# Patient Record
Sex: FEMALE | Race: WHITE | NOT HISPANIC OR LATINO | Employment: FULL TIME | URBAN - METROPOLITAN AREA
[De-identification: names, ages, dates, MRNs, and addresses within clinical notes are randomized per-mention and may not be internally consistent; named-entity substitution may affect disease eponyms.]

---

## 2023-05-12 ENCOUNTER — APPOINTMENT (OUTPATIENT)
Dept: ADMISSIONS | Facility: MEDICAL CENTER | Age: 48
End: 2023-05-12
Attending: ORTHOPAEDIC SURGERY
Payer: COMMERCIAL

## 2023-05-16 ENCOUNTER — PRE-ADMISSION TESTING (OUTPATIENT)
Dept: ADMISSIONS | Facility: MEDICAL CENTER | Age: 48
End: 2023-05-16
Attending: ORTHOPAEDIC SURGERY
Payer: COMMERCIAL

## 2023-05-17 ENCOUNTER — ANESTHESIA EVENT (OUTPATIENT)
Dept: SURGERY | Facility: MEDICAL CENTER | Age: 48
End: 2023-05-17
Payer: COMMERCIAL

## 2023-05-17 ENCOUNTER — APPOINTMENT (OUTPATIENT)
Dept: RADIOLOGY | Facility: MEDICAL CENTER | Age: 48
End: 2023-05-17
Attending: ORTHOPAEDIC SURGERY
Payer: COMMERCIAL

## 2023-05-17 ENCOUNTER — ANESTHESIA (OUTPATIENT)
Dept: SURGERY | Facility: MEDICAL CENTER | Age: 48
End: 2023-05-17
Payer: COMMERCIAL

## 2023-05-17 ENCOUNTER — HOSPITAL ENCOUNTER (OUTPATIENT)
Facility: MEDICAL CENTER | Age: 48
End: 2023-05-17
Attending: ORTHOPAEDIC SURGERY | Admitting: ORTHOPAEDIC SURGERY
Payer: COMMERCIAL

## 2023-05-17 VITALS
WEIGHT: 172.84 LBS | TEMPERATURE: 97.6 F | HEART RATE: 90 BPM | RESPIRATION RATE: 18 BRPM | BODY MASS INDEX: 32.63 KG/M2 | SYSTOLIC BLOOD PRESSURE: 124 MMHG | OXYGEN SATURATION: 93 % | HEIGHT: 61 IN | DIASTOLIC BLOOD PRESSURE: 58 MMHG

## 2023-05-17 PROCEDURE — 160048 HCHG OR STATISTICAL LEVEL 1-5: Performed by: ORTHOPAEDIC SURGERY

## 2023-05-17 PROCEDURE — 160046 HCHG PACU - 1ST 60 MINS PHASE II: Performed by: ORTHOPAEDIC SURGERY

## 2023-05-17 PROCEDURE — 700101 HCHG RX REV CODE 250: Performed by: ORTHOPAEDIC SURGERY

## 2023-05-17 PROCEDURE — 64445 NJX AA&/STRD SCIATIC NRV IMG: CPT | Mod: 59,LT | Performed by: STUDENT IN AN ORGANIZED HEALTH CARE EDUCATION/TRAINING PROGRAM

## 2023-05-17 PROCEDURE — 700105 HCHG RX REV CODE 258: Performed by: ORTHOPAEDIC SURGERY

## 2023-05-17 PROCEDURE — 160009 HCHG ANES TIME/MIN: Performed by: ORTHOPAEDIC SURGERY

## 2023-05-17 PROCEDURE — 110371 HCHG SHELL REV 272: Performed by: ORTHOPAEDIC SURGERY

## 2023-05-17 PROCEDURE — 160035 HCHG PACU - 1ST 60 MINS PHASE I: Performed by: ORTHOPAEDIC SURGERY

## 2023-05-17 PROCEDURE — 700101 HCHG RX REV CODE 250: Performed by: STUDENT IN AN ORGANIZED HEALTH CARE EDUCATION/TRAINING PROGRAM

## 2023-05-17 PROCEDURE — 160002 HCHG RECOVERY MINUTES (STAT): Performed by: ORTHOPAEDIC SURGERY

## 2023-05-17 PROCEDURE — 160028 HCHG SURGERY MINUTES - 1ST 30 MINS LEVEL 3: Performed by: ORTHOPAEDIC SURGERY

## 2023-05-17 PROCEDURE — 28615 REPAIR FOOT DISLOCATION: CPT | Mod: ASROC,59 | Performed by: SPECIALIST/TECHNOLOGIST, OTHER

## 2023-05-17 PROCEDURE — 28615 REPAIR FOOT DISLOCATION: CPT | Mod: 59 | Performed by: ORTHOPAEDIC SURGERY

## 2023-05-17 PROCEDURE — 160039 HCHG SURGERY MINUTES - EA ADDL 1 MIN LEVEL 3: Performed by: ORTHOPAEDIC SURGERY

## 2023-05-17 PROCEDURE — 73630 X-RAY EXAM OF FOOT: CPT | Mod: LT

## 2023-05-17 PROCEDURE — 700102 HCHG RX REV CODE 250 W/ 637 OVERRIDE(OP): Performed by: STUDENT IN AN ORGANIZED HEALTH CARE EDUCATION/TRAINING PROGRAM

## 2023-05-17 PROCEDURE — 160025 RECOVERY II MINUTES (STATS): Performed by: ORTHOPAEDIC SURGERY

## 2023-05-17 PROCEDURE — 700111 HCHG RX REV CODE 636 W/ 250 OVERRIDE (IP): Performed by: STUDENT IN AN ORGANIZED HEALTH CARE EDUCATION/TRAINING PROGRAM

## 2023-05-17 PROCEDURE — 01480 ANES OPEN PX LOWER L/A/F NOS: CPT | Performed by: STUDENT IN AN ORGANIZED HEALTH CARE EDUCATION/TRAINING PROGRAM

## 2023-05-17 PROCEDURE — 64445 NJX AA&/STRD SCIATIC NRV IMG: CPT | Performed by: ORTHOPAEDIC SURGERY

## 2023-05-17 PROCEDURE — C1713 ANCHOR/SCREW BN/BN,TIS/BN: HCPCS | Performed by: ORTHOPAEDIC SURGERY

## 2023-05-17 PROCEDURE — A9270 NON-COVERED ITEM OR SERVICE: HCPCS | Performed by: STUDENT IN AN ORGANIZED HEALTH CARE EDUCATION/TRAINING PROGRAM

## 2023-05-17 PROCEDURE — 700111 HCHG RX REV CODE 636 W/ 250 OVERRIDE (IP): Performed by: ORTHOPAEDIC SURGERY

## 2023-05-17 DEVICE — SCREW BONE VARIAX T10 FULL THREAD L24 MM OD3.5 MM FOOT ANKLE STARDRIVE NONSTERILE: Type: IMPLANTABLE DEVICE | Site: FOOT | Status: FUNCTIONAL

## 2023-05-17 DEVICE — WIRE FIXATION KIRSCHNER L150 MM OD1.6 MM: Type: IMPLANTABLE DEVICE | Site: FOOT | Status: FUNCTIONAL

## 2023-05-17 DEVICE — SCREW BONE T8 FULL THREAD L16 MM OD2.7 MM STARDRIVE NONSTERILE VARIAX FOOT PLATE SYSTEM: Type: IMPLANTABLE DEVICE | Site: FOOT | Status: FUNCTIONAL

## 2023-05-17 DEVICE — IMPLANTABLE DEVICE: Type: IMPLANTABLE DEVICE | Site: FOOT | Status: FUNCTIONAL

## 2023-05-17 DEVICE — SCREW BONE T8 FULL THREAD L28 MM OD2.7 MM STARDRIVE NONSTERILE VARIAX FOOT PLATE SYSTEM: Type: IMPLANTABLE DEVICE | Site: FOOT | Status: FUNCTIONAL

## 2023-05-17 DEVICE — SCREW BONE VARIAX T10 FULL THREAD L30 MM OD3.5 MM FOOT ANKLE STARDRIVE NONSTERILE: Type: IMPLANTABLE DEVICE | Site: FOOT | Status: FUNCTIONAL

## 2023-05-17 RX ORDER — MAGNESIUM HYDROXIDE 1200 MG/15ML
LIQUID ORAL
Status: COMPLETED | OUTPATIENT
Start: 2023-05-17 | End: 2023-05-17

## 2023-05-17 RX ORDER — CEFAZOLIN SODIUM 1 G/3ML
2 INJECTION, POWDER, FOR SOLUTION INTRAMUSCULAR; INTRAVENOUS ONCE
Status: COMPLETED | OUTPATIENT
Start: 2023-05-17 | End: 2023-05-17

## 2023-05-17 RX ORDER — DIPHENHYDRAMINE HYDROCHLORIDE 50 MG/ML
12.5 INJECTION INTRAMUSCULAR; INTRAVENOUS
Status: DISCONTINUED | OUTPATIENT
Start: 2023-05-17 | End: 2023-05-17 | Stop reason: HOSPADM

## 2023-05-17 RX ORDER — DEXAMETHASONE SODIUM PHOSPHATE 4 MG/ML
INJECTION, SOLUTION INTRA-ARTICULAR; INTRALESIONAL; INTRAMUSCULAR; INTRAVENOUS; SOFT TISSUE PRN
Status: DISCONTINUED | OUTPATIENT
Start: 2023-05-17 | End: 2023-05-17 | Stop reason: SURG

## 2023-05-17 RX ORDER — SODIUM CHLORIDE, SODIUM LACTATE, POTASSIUM CHLORIDE, CALCIUM CHLORIDE 600; 310; 30; 20 MG/100ML; MG/100ML; MG/100ML; MG/100ML
INJECTION, SOLUTION INTRAVENOUS CONTINUOUS
Status: DISCONTINUED | OUTPATIENT
Start: 2023-05-17 | End: 2023-05-17 | Stop reason: HOSPADM

## 2023-05-17 RX ORDER — MIDAZOLAM HYDROCHLORIDE 1 MG/ML
INJECTION INTRAMUSCULAR; INTRAVENOUS PRN
Status: DISCONTINUED | OUTPATIENT
Start: 2023-05-17 | End: 2023-05-17 | Stop reason: SURG

## 2023-05-17 RX ORDER — EPHEDRINE SULFATE 50 MG/ML
INJECTION, SOLUTION INTRAVENOUS PRN
Status: DISCONTINUED | OUTPATIENT
Start: 2023-05-17 | End: 2023-05-17 | Stop reason: SURG

## 2023-05-17 RX ORDER — HYDROMORPHONE HYDROCHLORIDE 1 MG/ML
0.1 INJECTION, SOLUTION INTRAMUSCULAR; INTRAVENOUS; SUBCUTANEOUS
Status: DISCONTINUED | OUTPATIENT
Start: 2023-05-17 | End: 2023-05-17 | Stop reason: HOSPADM

## 2023-05-17 RX ORDER — HALOPERIDOL 5 MG/ML
1 INJECTION INTRAMUSCULAR
Status: DISCONTINUED | OUTPATIENT
Start: 2023-05-17 | End: 2023-05-17 | Stop reason: HOSPADM

## 2023-05-17 RX ORDER — MEPERIDINE HYDROCHLORIDE 25 MG/ML
12.5 INJECTION INTRAMUSCULAR; INTRAVENOUS; SUBCUTANEOUS
Status: DISCONTINUED | OUTPATIENT
Start: 2023-05-17 | End: 2023-05-17 | Stop reason: HOSPADM

## 2023-05-17 RX ORDER — ONDANSETRON 2 MG/ML
INJECTION INTRAMUSCULAR; INTRAVENOUS PRN
Status: DISCONTINUED | OUTPATIENT
Start: 2023-05-17 | End: 2023-05-17 | Stop reason: SURG

## 2023-05-17 RX ORDER — CELECOXIB 200 MG/1
200 CAPSULE ORAL ONCE
Status: COMPLETED | OUTPATIENT
Start: 2023-05-17 | End: 2023-05-17

## 2023-05-17 RX ORDER — BUPIVACAINE HYDROCHLORIDE 5 MG/ML
INJECTION, SOLUTION EPIDURAL; INTRACAUDAL
Status: COMPLETED | OUTPATIENT
Start: 2023-05-17 | End: 2023-05-17

## 2023-05-17 RX ORDER — OXYCODONE HCL 5 MG/5 ML
10 SOLUTION, ORAL ORAL
Status: COMPLETED | OUTPATIENT
Start: 2023-05-17 | End: 2023-05-17

## 2023-05-17 RX ORDER — ACETAMINOPHEN 500 MG
1000 TABLET ORAL EVERY 6 HOURS PRN
COMMUNITY

## 2023-05-17 RX ORDER — HYDRALAZINE HYDROCHLORIDE 20 MG/ML
5 INJECTION INTRAMUSCULAR; INTRAVENOUS
Status: DISCONTINUED | OUTPATIENT
Start: 2023-05-17 | End: 2023-05-17 | Stop reason: HOSPADM

## 2023-05-17 RX ORDER — PHENYLEPHRINE HYDROCHLORIDE 10 MG/ML
INJECTION, SOLUTION INTRAMUSCULAR; INTRAVENOUS; SUBCUTANEOUS PRN
Status: DISCONTINUED | OUTPATIENT
Start: 2023-05-17 | End: 2023-05-17 | Stop reason: SURG

## 2023-05-17 RX ORDER — EPHEDRINE SULFATE 50 MG/ML
5 INJECTION, SOLUTION INTRAVENOUS
Status: DISCONTINUED | OUTPATIENT
Start: 2023-05-17 | End: 2023-05-17 | Stop reason: HOSPADM

## 2023-05-17 RX ORDER — ONDANSETRON 2 MG/ML
4 INJECTION INTRAMUSCULAR; INTRAVENOUS
Status: DISCONTINUED | OUTPATIENT
Start: 2023-05-17 | End: 2023-05-17 | Stop reason: HOSPADM

## 2023-05-17 RX ORDER — HYDROMORPHONE HYDROCHLORIDE 1 MG/ML
0.4 INJECTION, SOLUTION INTRAMUSCULAR; INTRAVENOUS; SUBCUTANEOUS
Status: DISCONTINUED | OUTPATIENT
Start: 2023-05-17 | End: 2023-05-17 | Stop reason: HOSPADM

## 2023-05-17 RX ORDER — LABETALOL HYDROCHLORIDE 5 MG/ML
5 INJECTION, SOLUTION INTRAVENOUS
Status: DISCONTINUED | OUTPATIENT
Start: 2023-05-17 | End: 2023-05-17 | Stop reason: HOSPADM

## 2023-05-17 RX ORDER — OXYCODONE HCL 5 MG/5 ML
5 SOLUTION, ORAL ORAL
Status: COMPLETED | OUTPATIENT
Start: 2023-05-17 | End: 2023-05-17

## 2023-05-17 RX ORDER — SCOLOPAMINE TRANSDERMAL SYSTEM 1 MG/1
1 PATCH, EXTENDED RELEASE TRANSDERMAL
Status: DISCONTINUED | OUTPATIENT
Start: 2023-05-17 | End: 2023-05-17 | Stop reason: HOSPADM

## 2023-05-17 RX ORDER — HYDROMORPHONE HYDROCHLORIDE 1 MG/ML
0.2 INJECTION, SOLUTION INTRAMUSCULAR; INTRAVENOUS; SUBCUTANEOUS
Status: DISCONTINUED | OUTPATIENT
Start: 2023-05-17 | End: 2023-05-17 | Stop reason: HOSPADM

## 2023-05-17 RX ORDER — LIDOCAINE HYDROCHLORIDE 20 MG/ML
INJECTION, SOLUTION EPIDURAL; INFILTRATION; INTRACAUDAL; PERINEURAL PRN
Status: DISCONTINUED | OUTPATIENT
Start: 2023-05-17 | End: 2023-05-17 | Stop reason: SURG

## 2023-05-17 RX ADMIN — PHENYLEPHRINE HYDROCHLORIDE 100 MCG: 10 INJECTION INTRAVENOUS at 12:59

## 2023-05-17 RX ADMIN — EPHEDRINE SULFATE 5 MG: 50 INJECTION, SOLUTION INTRAVENOUS at 12:39

## 2023-05-17 RX ADMIN — DEXAMETHASONE SODIUM PHOSPHATE 4 MG: 4 INJECTION INTRA-ARTICULAR; INTRALESIONAL; INTRAMUSCULAR; INTRAVENOUS; SOFT TISSUE at 12:43

## 2023-05-17 RX ADMIN — PROPOFOL 250 MG: 10 INJECTION, EMULSION INTRAVENOUS at 12:39

## 2023-05-17 RX ADMIN — CEFAZOLIN 2 G: 1 INJECTION, POWDER, FOR SOLUTION INTRAMUSCULAR; INTRAVENOUS at 12:40

## 2023-05-17 RX ADMIN — MIDAZOLAM 2 MG: 1 INJECTION, SOLUTION INTRAMUSCULAR; INTRAVENOUS at 12:24

## 2023-05-17 RX ADMIN — OXYCODONE HYDROCHLORIDE 5 MG: 5 SOLUTION ORAL at 13:55

## 2023-05-17 RX ADMIN — LIDOCAINE HYDROCHLORIDE 100 MG: 20 INJECTION, SOLUTION EPIDURAL; INFILTRATION; INTRACAUDAL at 12:39

## 2023-05-17 RX ADMIN — SCOPOLAMINE 1 PATCH: 1.5 PATCH, EXTENDED RELEASE TRANSDERMAL at 11:35

## 2023-05-17 RX ADMIN — FENTANYL CITRATE 50 MCG: 50 INJECTION, SOLUTION INTRAMUSCULAR; INTRAVENOUS at 12:35

## 2023-05-17 RX ADMIN — FENTANYL CITRATE 25 MCG: 50 INJECTION, SOLUTION INTRAMUSCULAR; INTRAVENOUS at 14:00

## 2023-05-17 RX ADMIN — FENTANYL CITRATE 25 MCG: 50 INJECTION, SOLUTION INTRAMUSCULAR; INTRAVENOUS at 14:03

## 2023-05-17 RX ADMIN — PHENYLEPHRINE HYDROCHLORIDE 200 MCG: 10 INJECTION INTRAVENOUS at 12:47

## 2023-05-17 RX ADMIN — FENTANYL CITRATE 25 MCG: 50 INJECTION, SOLUTION INTRAMUSCULAR; INTRAVENOUS at 14:06

## 2023-05-17 RX ADMIN — ONDANSETRON 4 MG: 2 INJECTION INTRAMUSCULAR; INTRAVENOUS at 13:51

## 2023-05-17 RX ADMIN — SODIUM CHLORIDE, POTASSIUM CHLORIDE, SODIUM LACTATE AND CALCIUM CHLORIDE: 600; 310; 30; 20 INJECTION, SOLUTION INTRAVENOUS at 11:30

## 2023-05-17 RX ADMIN — PHENYLEPHRINE HYDROCHLORIDE 100 MCG: 10 INJECTION INTRAVENOUS at 13:08

## 2023-05-17 RX ADMIN — PROPOFOL 100 MG: 10 INJECTION, EMULSION INTRAVENOUS at 12:42

## 2023-05-17 RX ADMIN — FENTANYL CITRATE 25 MCG: 50 INJECTION, SOLUTION INTRAMUSCULAR; INTRAVENOUS at 13:56

## 2023-05-17 RX ADMIN — FENTANYL CITRATE 50 MCG: 50 INJECTION, SOLUTION INTRAMUSCULAR; INTRAVENOUS at 12:43

## 2023-05-17 RX ADMIN — CELECOXIB 200 MG: 200 CAPSULE ORAL at 11:30

## 2023-05-17 RX ADMIN — BUPIVACAINE HYDROCHLORIDE 20 ML: 5 INJECTION, SOLUTION EPIDURAL; INTRACAUDAL at 12:25

## 2023-05-17 RX ADMIN — HYDROMORPHONE HYDROCHLORIDE 0.2 MG: 1 INJECTION, SOLUTION INTRAMUSCULAR; INTRAVENOUS; SUBCUTANEOUS at 14:23

## 2023-05-17 ASSESSMENT — FIBROSIS 4 INDEX: FIB4 SCORE: 0.63

## 2023-05-17 ASSESSMENT — PAIN DESCRIPTION - PAIN TYPE: TYPE: SURGICAL PAIN

## 2023-05-17 NOTE — ANESTHESIA TIME REPORT
Anesthesia Start and Stop Event Times     Date Time Event    5/17/2023 1234 Ready for Procedure     1235 Anesthesia Start     1353 Anesthesia Stop        Responsible Staff  05/17/23    Name Role Begin End    Cristian Mancuso D.O. Anesth 1235 1359        Overtime Reason:  no overtime (within assigned shift)    Comments:

## 2023-05-17 NOTE — ANESTHESIA POSTPROCEDURE EVALUATION
Patient: oRya Mancia    Procedure Summary     Date: 05/17/23 Room / Location: [place] [address] / SURGERY [facility]    Anesthesia Start: 1235 Anesthesia Stop: 1353    Procedures:       1ST TARSOMETATARSAL JOINT OPEN REDUCTION INTERNAL FIXATION (Left: Foot)      LEFT LISFRANC OPEN REDUCTION INTERNAL FIXATION (Left: Foot) Diagnosis:       Lisfranc dislocation, left, subsequent encounter      (Left lisfranc dislocation)    Surgeons: Kike Aragon M.D. Responsible Provider: Cristian Mancuso D.O.    Anesthesia Type: general, peripheral nerve block ASA Status: 2          Final Anesthesia Type: general, peripheral nerve block  Last vitals  BP   Blood Pressure: 105/53    Temp   36.6 °C (97.8 °F)    Pulse   (!) 101   Resp   20    SpO2   94 %      Anesthesia Post Evaluation    Patient location during evaluation: PACU  Patient participation: complete - patient participated  Level of consciousness: awake and alert    Airway patency: patent  Anesthetic complications: no  Cardiovascular status: hemodynamically stable  Respiratory status: acceptable  Hydration status: euvolemic    PONV: none          No notable events documented.     Nurse Pain Score: 7 (NPRS)

## 2023-05-17 NOTE — ANESTHESIA PREPROCEDURE EVALUATION
Case: [id number] Date/Time: 05/17/23 1215    Procedures:       LEFT LISFRANC OPEN REDUCTION INTERNAL FIXATION, 1ST TARSOMETATARSAL JOINT OPEN REDUCTION INTERNAL FIXATION, POSSIBLE 1/2 TARSOMETATARSAL FUSION, POSSIBLE CALCANEAL BONE GRAFT (Foot)      BONE GRAFT      REPAIR, FRACTURE DISLOCATION, TARSOMETATARSAL JOINT    Diagnosis: Lisfranc dislocation, left, subsequent encounter [T64.203D]    Pre-op diagnosis: Lisfranc dislocation, left, subsequent encounter [N25.858F]    Location: [place] [address] / SURGERY [facility]    Surgeons: Kike Aragon M.D.          Relevant Problems   No relevant active problems       Physical Exam    Airway   Mallampati: II  TM distance: >3 FB  Neck ROM: full       Cardiovascular - normal exam  Rhythm: regular  Rate: normal  (-) murmur     Dental - normal exam           Pulmonary - normal exam  Breath sounds clear to auscultation     Abdominal    Neurological - normal exam                 Anesthesia Plan    ASA 2       Plan - general and peripheral nerve block     Peripheral nerve block will be post-op pain control  Airway plan will be LMA          Induction: intravenous    Postoperative Plan: Postoperative administration of opioids is intended.    Pertinent diagnostic labs and testing reviewed    Informed Consent:    Anesthetic plan and risks discussed with patient.    Use of blood products discussed with: patient whom consented to blood products.

## 2023-05-17 NOTE — ANESTHESIA PROCEDURE NOTES
Airway    Date/Time: 5/17/2023 12:40 PM    Performed by: Cristian Mancuso D.O.  Authorized by: Cristian Mancuso D.O.    Location:  OR  Urgency:  Elective  Difficult Airway: No    Indications for Airway Management:  Anesthesia      Spontaneous Ventilation: absent    Sedation Level:  Deep  Preoxygenated: Yes    Final Airway Type:  Supraglottic airway  Final Supraglottic Airway:  Standard LMA    SGA Size:  4  Number of Attempts at Approach:  1   Patient had significant hiccuping post LMA placement, required significant propofol to reduce reflex, additional fentanyl given for that as well.

## 2023-05-17 NOTE — ANESTHESIA PROCEDURE NOTES
Peripheral Block    Date/Time: 5/17/2023 12:25 PM    Performed by: Cristian Mancuso D.O.  Authorized by: Cristian Mancuso D.O.    Start Time:  5/17/2023 12:25 PM  End Time:  5/17/2023 12:30 PM  Reason for Block: at surgeon's request and post-op pain management ONLY    patient identified, IV checked, site marked, risks and benefits discussed, surgical consent, monitors and equipment checked, pre-op evaluation and timeout performed    Patient Position:  Supine  Prep: ChloraPrep    Monitoring:  Heart rate, continuous pulse ox and cardiac monitor  Block Region:  Lower Extremity  Lower Extremity - Block Type:  SCIATIC nerve block, lateral approach    Laterality:  Left  Procedures: ultrasound guided  Image captured, interpreted and electronically stored.  Local Infiltration:  Lidocaine  Strength:  1 %  Dose:  3 ml  Block Type:  Single-shot  Needle Length:  100mm  Needle Gauge:  21 G  Needle Localization:  Ultrasound guidance  Injection Assessment:  Negative aspiration for heme, no paresthesia on injection, incremental injection and local visualized surrounding nerve on ultrasound   US Guided Sciatic Nerve Block   US probe placed several cm proximal to popliteal crease on posterior thigh and scanned caudad and cephalad until Sciatic Nerve (SN) identified superficial/lateral to popliteal artery.  Needle inserted lateral to probe in an in plane approach under direct visualization to a perineural position.  After negative aspiration LA injected with ease and visualized surrounding the SN.

## 2023-05-17 NOTE — DISCHARGE INSTRUCTIONS
HOME CARE INSTRUCTIONS    ACTIVITY: Rest and take it easy for the first 24 hours.  A responsible adult is recommended to remain with you during that time.  It is normal to feel sleepy.  We encourage you to not do anything that requires balance, judgment or coordination.    FOR 24 HOURS DO NOT:  Drive, operate machinery or run household appliances.  Drink beer or alcoholic beverages.  Make important decisions or sign legal documents.    SPECIAL INSTRUCTIONS: Follow any additional instructions form Dr Aragon in [facility] folder    Peripheral Nerve Block Discharge Instructions from Same Day Surgery and Inpatient :    What to Expect - Lower Extremity  The block may cause you to experience numbness and weakness in your calf and foot on the same side as your surgery  Numbness, tingling and / or weakness are all normal. For some people, this may be an unpleasant sensation  These issues will be resolved when the local anesthetic wears off   You may experience numbness and tingling in your thigh on the same side as your surgery if the block medicine was injected at your groin area  Numbness will make it difficult to walk  You may have problems with balance and walking so be very careful   Follow your surgeon's direction regarding weight bearing on your surgical limb  Be very careful with your numb limb  Precautions  The numbness may affect your balance  Be careful when walking or moving around  Your leg may be weak: be very careful putting weight on it  If your surgeon did not specify a time, you should not bear weight for 24 hours  Be sure to ask for help when you need it  It is better to have help than to fall and hurt yourself  Prevent Injury  Protect the limb like a baby  Beware of exposing your limb to extreme heat or cold or trauma  The limb may be injured without you noticing because it is numb  Keep the limb elevated whenever possible  Do not sleep on the limb  Change the position of the limb regularly  Avoid putting  "pressure on your surgical limb  Pain Control  The initial block on the day of surgery will make your extremity feel \"numb\"  Any consecutive injection including prior to discharge from the hospital will make your extremity feel \"numb\"  You may feel an aching or burning when the local anesthesia starts to wear off  Take pain pills as prescribed by your surgeon  Call your surgeon or anesthesiologist if you do not have adequate pain control    DIET: To avoid nausea, slowly advance diet as tolerated, avoiding spicy or greasy foods for the first day.  Add more substantial food to your diet according to your physician's instructions.  INCREASE FLUIDS AND FIBER TO AVOID CONSTIPATION.    SURGICAL DRESSING/BATHING: Keep dressing clean, dry and intact, cover with plastic to shower    MEDICATIONS: Resume taking daily medication.  Take prescribed pain medication with food.  If no medication is prescribed, you may take non-aspirin pain medication if needed.  PAIN MEDICATION CAN BE VERY CONSTIPATING.  Take a stool softener or laxative such as senokot, pericolace, or milk of magnesia if needed.    Prescription given for Oxycodone sent to [facility].  Last pain medication given at 11:30am Celebrex 200mg and Oxycodone at 1:55 pm.    You have a Scopolamine Patch behind your right ear (reduces nausea), this patch can stay in place for up to 72 hours, you may remove it sooner, when removing patch, wrap it in tissue, discard in trash and wash your hands..    A follow-up appointment should be arranged with your doctor in 1-2 weeks; call to schedule.    You should CALL YOUR PHYSICIAN if you develop:  Fever greater than 101 degrees F.  Pain not relieved by medication, or persistent nausea or vomiting.  Excessive bleeding (blood soaking through dressing) or unexpected drainage from the wound.  Extreme redness or swelling around the incision site, drainage of pus or foul smelling drainage.  Inability to urinate or empty your bladder " within 8 hours.  Problems with breathing or chest pain.    You should call 911 if you develop problems with breathing or chest pain.  If you are unable to contact your doctor or surgical center, you should go to the nearest emergency room or urgent care center.      Physician's telephone #: [phone] Dr Moya    MILD FLU-LIKE SYMPTOMS ARE NORMAL.  YOU MAY EXPERIENCE GENERALIZED MUSCLE ACHES, THROAT IRRITATION, HEADACHE AND/OR SOME NAUSEA.    If any questions arise, call your doctor.  If your doctor is not available, please feel free to call the Surgical Center at [phone].  The Center is open Monday through Friday from 7AM to 7PM.      A registered nurse may call you a few days after your surgery to see how you are doing after your procedure.    You may also receive a survey in the mail within the next two weeks and we ask that you take a few moments to complete the survey and return it to us.  Our goal is to provide you with very good care and we value your comments.     Depression / Suicide Risk    As you are discharged from this [facility] facility, it is important to learn how to keep safe from harming yourself.    Recognize the warning signs:  Abrupt changes in personality, positive or negative- including increase in energy   Giving away possessions  Change in eating patterns- significant weight changes-  positive or negative  Change in sleeping patterns- unable to sleep or sleeping all the time   Unwillingness or inability to communicate  Depression  Unusual sadness, discouragement and loneliness  Talk of wanting to die  Neglect of personal appearance   Rebelliousness- reckless behavior  Withdrawal from people/activities they love  Confusion- inability to concentrate     If you or a loved one observes any of these behaviors or has concerns about self-harm, here's what you can do:  Talk about it- your feelings and reasons for harming yourself  Remove any means that you might use to hurt  yourself (examples: pills, rope, extension cords, firearm)  Get professional help from the community (Mental Health, Substance Abuse, psychological counseling)  Do not be alone:Call your Safe Contact- someone whom you trust who will be there for you.  Call your local CRISIS HOTLINE 519-1039 or 940-358-2509  Call your local Children's Mobile Crisis Response Team [place] (946) 346-7253 or www.Nuve  Call the toll free National Suicide Prevention Hotlines   National Suicide Prevention Lifeline 824-033-DPRO (4241)  [facility] Line Network 800-SUICIDE (489-3087)    I acknowledge receipt and understanding of these Home Care instructions.

## 2023-05-17 NOTE — PROGRESS NOTES
Med rec updated and complete, per pt   Allergies reviewed, per pt  Interviewed pt with family at bedside with permission from pt.

## 2023-05-17 NOTE — LETTER
May 11, 2023    Patient Name: Roya Mancia  Surgeon Name: Kike Aragon M.D.  Surgery Facility: [facility] ([address] [facility])  Surgery Date: 5/17/2023    The time of your surgery is not final and may change up to and until the day of your surgery. You will be contacted 24-48 hours prior to your surgery date with your check-in and surgery time.    If you will not be at one of the below numbers please call the surgery scheduler at [phone]  Preferred Phone: [phone]    BEFORE YOUR SURGERY   Pre Registration and/or Lab Work must be done within and no earlier than 28 days prior to your surgery date. Please call [facility] at [phone] for an appointment as soon as possible.    The [facility] offers a class for patients undergoing a total hip/knee replacement surgery scheduled at our outpatient surgery center. Information about what to expect for preparation, the day of surgery and recovery will be given. We highly recommend bringing your support for surgery with you to the class, as well as any questions you may have. If you are interested in attending the class, please call [phone] for scheduling.     Pre op Appointment:    Instructions: Bring a list of all medications you are taking including the dosing and frequency.    DAY OF YOUR SURGERY  Nothing to eat or drink after midnight     Refrain from smoking any substance after midnight prior to surgery. Smoking may interfere with the anesthetic and frequently produces nausea during the recovery period.    Continue taking all lifesaving medications. Including the morning of your surgery with small sip of water.    Please do NOT take on the day of surgery:  Diuretics: examples- furosemide (Lasix), spironolactone, hydrochlorothiazide  ACE-inhibitors: examples- lisinopril, ramipril, enalapril  “ARBs”: examples- losartan, Olmesartan, valsartan    Please arrive at the hospital/surgery center at  the check-in time provided.     An adult will need to bring you and take you home after your surgery.     AFTER YOUR SURGERY  Post op Appointment:   Date: 06/01/23   Time: 09:45AM   With: Miguel A Aragon MD   Location: [address] [place] [id number]    TIME OFF WORK  FMLA or Disability forms can be faxed directly to: [phone] or you may drop them off at [address] [place] [phone]. Our office charges a $35.00 fee per form. Forms will be completed within 10 business days of drop off and payment received. For the status of your forms you may contact our disability office directly [phone].    MEDICATION INSTRUCTIONS **Please read section completely**    The following medications should be stopped a minimum of 10 days prior to surgery:  All over the counter, Supplements & Herbal medications    Anorectics: Phentermine (Adipex-P, Lomaira and Suprenza), Phentermine-topiramate (Qsymia), Bupropion-naltrexone (Contrave)    Opiod Partial Agonists/Opioid Antagonists: Buprenorphine (Subocone, Belbuca, Butrans, Probuphine Implant, Sublocade), Naltrexone (ReVia, Vivitrol), Naloxone    Amphetamines: Dextroamphetamine/Amphetamine (Adderall, Mydayis), Methylphenidate Hydrochloride (Concerta, Metadate, Methylin, Ritalin)    The following medications should be stopped 5 days prior to surgery:  Blood Thinners: Any Aspirin, Aspirin products, anti-inflammatories such as ibuprofen and any blood thinners such as Coumadin and Plavix. Please consult your prescribing physician if you are on life saving blood thinners, in regards to when to stop medications prior to surgery.     The following medications should be stopped a minimum of 3 days prior to surgery:  PDE-5 inhibitors: Sildenafil (Viagra), Tadalafil (Cialis), Vardenafil (Levitra), Avanafil (Stendra)    MAO Inhibitors: Rasagiline (Azilect), Selegiline (Eldepryl, Emsam, Selapar), Isocarboxazid (Marplan), Phenelzine (Nardil)         COVID and  INFLUENZA NOTICE TO PATIENTS    Currently, the [facility] does not routinely test patients for COVID-19 or Influenza prior to their elective surgery.  However, if patients develop the following symptoms prior to their surgery date, they should voluntarily test for COVID-19 and Influenza and notify the surgical office of their condition and results.  The symptoms warranting testing would be any two of the following:    Fever (Temp above 100.4 F)  Chills  Cough  Shortness of breath or difficulty breathing  Fatigue  Myalgias (muscle or body aches)  Headache  Sore Throat  Congestion or Runny Nose  Nausea or vomiting  Diarrhea  New loss of taste or smell    Having these symptoms prior to surgery can significantly increase your risk of morbidity and mortality under anesthesia, which may compromise your health and require a transfer to a hospital for a higher level of care.  Therefore, it is advisable to notify the surgical team of any illness in order to get information for the appropriate time to delay the surgery to minimize these preventable risks.    Your health and safety are our number one priority at the [facility], and we are thankful that you entrust us with your care.  Please help us ensure the best possible surgical and anesthetic outcome by sharing appropriate health information with our perioperative team and staff.  We look forward to taking great care of you!    Thank you for your time and consideration on this matter.    Lazaro Truong MD  Medical Director  Anesthesiologist  [facility]

## 2023-05-17 NOTE — OP REPORT
Date of operation: 5/17/2023    Service: Orthopaedic Surgery    Surgeon: Kike Aragon MD    Assistant: YA Mendenhall    Preoperative Diagnosis:   Left foot Lisfranc dislocation  Left foot first TMT joint dislocation    Post operative Diagnosis: Same    Procedure Performed:   Open reduction internal fixation left first TMT joint   Open reduction internal fixation left foot Lisfranc joint     Complications: None    Specimens: None    Tourniquet Time: 48 minutes    Implants: Jamie variax foot plates and screws      Indication for procedure: The patient is a pleasant 48-year-old female who sustained the above diagnosis approximate 2 weeks prior to the date of surgery.  She was found to have continued pain with attempted weightbearing, disruption of the Lisfranc ligament and first TMT joint on MRI.  We discussed operative and nonoperative treatment, operative intervention was recommended given the instability.  We also discussed fixation versus fusion options for this type of injury.  She elected for fixation with planned hardware removal once she had completely healed. Risks of the procedure were discussed with the patient which include but not limited to bleeding, infection, damage to surrounding nerves, tendons, ligaments, other structures, need for future or revision surgery, continued pain, unsightly scar, dissatisfaction with outcome, DVT, stroke, myocardial infarction and even death.  Benefits include improved pain control and improved overall functional outcome.  The patient wished to proceed and the surgical consent forms were signed.    Description of Procedure: On the date of surgery the patient was met in the preoperative area where the operative extremity was identified by myself, confirmed the patient, marked with my initials.  The patient was then brought back to the operating room, placed supine on the operating table.  Anesthesia was undertaken without incident.  A nonsterile thigh  tourniquet was placed on the operative thigh, SCD on the contralateral lower extremity.  Operative extremity was prepped and draped in the usual sterile fashion.  Multidisciplinary timeout was performed to confirm the correct site, correct patient, correct procedure. The patient received antibiotics within 30 minutes of incision. Operative extremity was then elevated for approximately 2 minutes time and the tourniquet was brought to 250mmHg    We began with a dorsal incision centered over the first and second TMT joints.  This was carefully dissected down.  Interval between the EHL and EHB was performed.  We then dissected down to the first TMT joint and Lisfranc joint space.  There is found to be gross instability of the first TMT joint in addition to the Lisfranc joint space.  The Lisfranc joint space was then cleared of torn ligament.  This was then also roughened up with a curette in addition to a pituitary rongeur.  We then reduced the first TMT joint and this was reduced in anatomic position with manual reduction techniques and provisionally pinned in place.  A dorsal neutralizing plate was then placed over the first TMT joint.  Nonlocking screws were utilized proximally and distally.  We then placed a medium reduction clamp percutaneously across the Lisfranc joint space and close down the Lisfranc joint space.  We then placed a 3.5 mm positional screw from the dorsal medial plate over the first TMT joint, traversing the Lisfranc joint and maintaining reduction.  This was placed in bicortical fashion with excellent reduction.  Fluoroscopic imaging demonstrated good overall alignment of the first TMT joint and Lisfranc joint space.  We then placed a dorsal bridging plate over the second TMT joint to further stabilize the construct.  Dorsal plate was selected and nonlocking screws were utilized proximally and distally.  Final fluoroscopic imaging demonstrated stable first, second, Lisfranc joint spaces.  No  evidence of hardware complication.  At this point the wound was copiously irrigated, the tourniquet was let down, hemostasis was achieved, wound was closed in layers.  Sterile dressings were applied and a short leg posterior splint was applied in neutral.  The patient was awoken from anesthesia, moved onto the postoperative gurney and moved to PACU in stable condition.    Post-Operative Plan: The patient will be nonweightbearing on the left lower extremity for the next 6 weeks.  She will be seen in the office in 2 weeks time at which point nonweightbearing x-rays of the left foot will be taken, and she will be placed into a short leg cast.

## (undated) DEVICE — SUTURE GENERAL

## (undated) DEVICE — SENSOR OXIMETER ADULT SPO2 RD SET (20EA/BX)

## (undated) DEVICE — CHLORAPREP 26 ML APPLICATOR - ORANGE TINT(25/CA)

## (undated) DEVICE — GLOVE BIOGEL ECLIPSE PF LATEX SIZE 8.0  (50PR/BX)

## (undated) DEVICE — DRESSING 3X8 ADAPTIC GAUZE - NON-ADHERING (36/PK 6PK/BX)

## (undated) DEVICE — GLOVE BIOGEL PI INDICATOR SZ 7.0 SURGICAL PF LF - (50/BX 4BX/CA)

## (undated) DEVICE — SET LEADWIRE 5 LEAD BEDSIDE DISPOSABLE ECG (1SET OF 5/EA)

## (undated) DEVICE — SPLINT PLASTER 5 IN X 30 IN - (50EA/BX 6BX/CA)

## (undated) DEVICE — BANDAGE ELASTIC 6 IN X 5 YDS - LATEX FREE (10/BX)

## (undated) DEVICE — TUBING CLEARLINK DUO-VENT - C-FLO (48EA/CA)

## (undated) DEVICE — SUCTION INSTRUMENT YANKAUER BULBOUS TIP W/O VENT (50EA/CA)

## (undated) DEVICE — COVER LIGHT HANDLE ALC PLUS DISP (18EA/BX)

## (undated) DEVICE — PAD LAP STERILE 18 X 18 - (5/PK 40PK/CA)

## (undated) DEVICE — SODIUM CHL IRRIGATION 0.9% 1000ML (12EA/CA)

## (undated) DEVICE — SUTURE 3-0 ETHILON PS-1 (36PK/BX)

## (undated) DEVICE — LACTATED RINGERS INJ 1000 ML - (14EA/CA 60CA/PF)

## (undated) DEVICE — ELECTRODE DUAL RETURN W/ CORD - (50/PK)

## (undated) DEVICE — GLOVE BIOGEL PI INDICATOR SZ 8.0 SURGICAL PF LF -(50/BX 4BX/CA)

## (undated) DEVICE — CANISTER SUCTION 3000ML MECHANICAL FILTER AUTO SHUTOFF MEDI-VAC NONSTERILE LF DISP  (40EA/CA)

## (undated) DEVICE — SET EXTENSION WITH 2 PORTS (48EA/CA) ***PART #2C8610 IS A SUBSTITUTE*****

## (undated) DEVICE — BIT DRILL DIA2.6MM SCALED FOR VARIAX 2 WRIST FUSION LOCKING PLATE SYSTEM

## (undated) DEVICE — GOWN WARMING STANDARD FLEX - (30/CA)

## (undated) DEVICE — WRAP CO-FLEX 4IN X 5YD STERIL - SELF-ADHERENT (18/CA)

## (undated) DEVICE — BIT DRILL DIA2MM SCALED FOR VARIAX 2 WRIST FUSION LOCKING PLATE SYSTEM

## (undated) DEVICE — DRESSING ABDOMINAL PAD STERILE 8 X 10" (360EA/CA)"

## (undated) DEVICE — TOURNIQUET CUFF 34 X 4 ONE PORT DISP - STERILE (10/BX)

## (undated) DEVICE — SUTURE 3-0 VICRYL PLUS SH - 27 INCH (36/BX)

## (undated) DEVICE — DRAPE C ARMOR (12EA/CA)

## (undated) DEVICE — PADDING CAST 6 IN STERILE - 6 X 4 YDS (24/CA)

## (undated) DEVICE — DRAPE LARGE 3 QUARTER - (20/CA)

## (undated) DEVICE — DRAPE 36X28IN RAD CARM BND BG - (25/CA) O

## (undated) DEVICE — GLOVE SZ 6.5 BIOGEL PI MICRO - PF LF (50PR/BX)

## (undated) DEVICE — GLOVE BIOGEL PI INDICATOR SZ 6.5 SURGICAL PF LF - (50/BX 4BX/CA)

## (undated) DEVICE — GOWN SURGEONS X-LARGE - DISP. (30/CA)

## (undated) DEVICE — BAG SPONGE COUNT 10.25 X 32 - BLUE (250/CA)

## (undated) DEVICE — BLADE SURGICAL #15 - (50/BX 3BX/CA)

## (undated) DEVICE — PACK LOWER EXTREMITY - (2/CA)

## (undated) DEVICE — MASK, LARYNGEAL AIRWAY #4

## (undated) DEVICE — SLEEVE VASO CALF MED - (10PR/CA)

## (undated) DEVICE — GLOVE BIOGEL PI ORTHO SZ 7 PF LF (40PR/BX)